# Patient Record
Sex: FEMALE | Race: WHITE
[De-identification: names, ages, dates, MRNs, and addresses within clinical notes are randomized per-mention and may not be internally consistent; named-entity substitution may affect disease eponyms.]

---

## 2017-04-07 ENCOUNTER — HOSPITAL ENCOUNTER (OUTPATIENT)
Age: 67
Discharge: HOME | End: 2017-04-07
Payer: MEDICARE

## 2017-04-07 DIAGNOSIS — C54.1: Primary | ICD-10-CM

## 2017-04-07 DIAGNOSIS — C56.9: ICD-10-CM

## 2020-04-14 ENCOUNTER — HOSPITAL ENCOUNTER (OUTPATIENT)
Dept: HOSPITAL 76 - LAB.WCP | Age: 70
Discharge: HOME | End: 2020-04-14
Attending: INTERNAL MEDICINE
Payer: MEDICARE

## 2020-04-14 DIAGNOSIS — E78.5: ICD-10-CM

## 2020-04-14 DIAGNOSIS — I10: Primary | ICD-10-CM

## 2020-04-14 LAB
ANION GAP SERPL CALCULATED.4IONS-SCNC: 6 MMOL/L (ref 6–13)
BUN SERPL-MCNC: 21 MG/DL (ref 6–20)
CALCIUM UR-MCNC: 9.5 MG/DL (ref 8.5–10.3)
CHLORIDE SERPL-SCNC: 106 MMOL/L (ref 101–111)
CHOLEST SERPL-MCNC: 274 MG/DL
CO2 SERPL-SCNC: 25 MMOL/L (ref 21–32)
CREAT SERPLBLD-SCNC: 1.1 MG/DL (ref 0.4–1)
GLUCOSE SERPL-MCNC: 92 MG/DL (ref 70–100)
HDLC SERPL-MCNC: 66 MG/DL
HDLC SERPL: 4.2 {RATIO} (ref ?–4.4)
LDLC SERPL CALC-MCNC: 156 MG/DL
LDLC/HDLC SERPL: 2.4 {RATIO} (ref ?–4.4)
SODIUM SERPLBLD-SCNC: 137 MMOL/L (ref 135–145)
VLDLC SERPL-SCNC: 52 MG/DL

## 2020-04-14 PROCEDURE — 83721 ASSAY OF BLOOD LIPOPROTEIN: CPT

## 2020-04-14 PROCEDURE — 80048 BASIC METABOLIC PNL TOTAL CA: CPT

## 2020-04-14 PROCEDURE — 36415 COLL VENOUS BLD VENIPUNCTURE: CPT

## 2020-04-14 PROCEDURE — 80061 LIPID PANEL: CPT

## 2021-10-10 ENCOUNTER — HOSPITAL ENCOUNTER (EMERGENCY)
Dept: HOSPITAL 76 - ED | Age: 71
Discharge: HOME | End: 2021-10-10
Payer: MEDICARE

## 2021-10-10 VITALS — DIASTOLIC BLOOD PRESSURE: 69 MMHG | SYSTOLIC BLOOD PRESSURE: 123 MMHG

## 2021-10-10 DIAGNOSIS — Z91.030: ICD-10-CM

## 2021-10-10 DIAGNOSIS — L53.9: ICD-10-CM

## 2021-10-10 DIAGNOSIS — M79.601: ICD-10-CM

## 2021-10-10 DIAGNOSIS — T63.441A: Primary | ICD-10-CM

## 2021-10-10 DIAGNOSIS — M79.672: ICD-10-CM

## 2021-10-10 DIAGNOSIS — M79.652: ICD-10-CM

## 2021-10-10 DIAGNOSIS — M79.651: ICD-10-CM

## 2021-10-10 PROCEDURE — 99282 EMERGENCY DEPT VISIT SF MDM: CPT

## 2021-10-10 PROCEDURE — 99284 EMERGENCY DEPT VISIT MOD MDM: CPT
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
